# Patient Record
(demographics unavailable — no encounter records)

---

## 2024-12-06 NOTE — HISTORY OF PRESENT ILLNESS
[FreeTextEntry1] : Patient seen for an  elevated and rising PSA He has been on finasteride and alfuzosin for years for LUTs. He stopped in in the Fall and then restarted 1-2 months ago as did note some worsening and then instant improvement when stopped and then restarted. PSA now 4.1 for @1 - has been rechecked at same number. Never been this high before and no prior biopsy. prostate by TRUS 84cc.  He has some LUTs - frequency and nocturia 1-2 times. FOS decent unless holds for prolonged period of time then has hesitancy and slower stream. if drinks more fluids in evening has more nocturia. Also notes more issues frequency and hesitancy etc on airplane.   last visit switched him to Tamsulosin - he sees not change. has frequency and nocturia depending on intake and coffee etc. FOS Ok - rarely good. If holds along time has hesitancy etc.    Opted for telehealth due to COVID19 - last visit had him switch to Terazosin and titrated up to 7mg. Noted better flow, less daytime frequency and fewer nocturia - 2-3 as opposed top > 3. No dizziness or fatigue..   after last visit increased to 5mg BID - has gone backwards - more frequency and nocturia up to 4 times . FOs variable No urge or other incontinence.  on finasteride  voided 80 cc with 6cc peak with PVR 0.  2/23 haven;t seen for 2 years: still on finasteride  had BP issues so switched from terazosin to tamsulosin -  had nocturia 4/5 times a night with hesitancy intermittency. daytime frequency with same issue of hesitancy - no different with medication changes. Pushing doesn't help.  had retention issues also post spinal surgery  PVR 0  5/23 - tried doubling up tamsulosin plus finasteride feels modest improvement. no dizziness  Issue id the nocturia and obstructive issues if holds for a long periods   11/23 on double dose tamsulosin and finasteride - overall feels better. if misses a dose harder to void. Overall FOS better unless holds too long. Nocturia 3 times with no urgency or incontinence.   12/24 on combination therapy voiding fine - nothing has changed  interested in switching out tamsulosin to tadalafil  PSA 2.2

## 2025-02-13 NOTE — ASSESSMENT
[FreeTextEntry1] : I reviewed the risks and benefits of the colonoscopy and answered the patient's questions. Diet and bowel prep instructions were provided. Medication guidance also provided for the days before and day of procedure.  Patient is aware that should they receive sedation for the procedure, they will need to arrange an escort to take them home. Will schedule procedure at [facility]. Miralax prep as this is what he used last time and prep was excellent.  Because he chronically takes ASA and meloxicam (two NSAIDs) and is 70 years old, he may be at increased risk for a GI bleeding event related to NSAID use. He may wish to take omeprazole 20 mg once a day on an empty stomach to decrease this risk.   I spent 8 minutes reviewing the patient's records and 15 minutes in face-to face time with at least 50% of time providing education or coordination of care. I spent an additional 5 minutes completing medical documentation today.

## 2025-02-13 NOTE — ASSESSMENT
[FreeTextEntry1] : I reviewed the risks and benefits of the colonoscopy and answered the patient's questions. Diet and bowel prep instructions were provided. Medication guidance also provided for the days before and day of procedure.  Patient is aware that should they receive sedation for the procedure, they will need to arrange an escort to take them home. Will schedule procedure at [facility]. Miralax prep as this is what he used last time and prep was excellent.  Because he chronically takes ASA and meloxicam (two NSAIDs) and is 70 years old, he may be at increased risk for a GI bleeding event related to NSAID use. He may wish to take omeprazole 20 mg once a day on an empty stomach to decrease this risk.   I spent 8 minutes reviewing the patient's records and 15 minutes in face-to face time with at least 50% of time providing education or coordination of care. I spent an additional 5 minutes completing medical documentation today.

## 2025-02-13 NOTE — PHYSICAL EXAM
[Alert] : alert [Normal Voice/Communication] : normal voice/communication [Healthy Appearing] : healthy appearing [No Acute Distress] : no acute distress [Sclera] : the sclera and conjunctiva were normal [Normal Appearance] : the appearance of the neck was normal [No Neck Mass] : no neck mass was observed [Respiration, Rhythm And Depth] : normal respiratory rhythm and effort [Auscultation Breath Sounds / Voice Sounds] : lungs were clear to auscultation bilaterally [Heart Rate And Rhythm] : heart rate was normal and rhythm regular [Normal S1, S2] : normal S1 and S2 [Murmurs] : no murmurs [Bowel Sounds] : normal bowel sounds [Abdomen Tenderness] : non-tender [No Masses] : no abdominal mass palpated [Abdomen Soft] : soft [] : no hepatosplenomegaly [No CVA Tenderness] : no CVA  tenderness [Normal Color / Pigmentation] : normal skin color and pigmentation [Oriented To Time, Place, And Person] : oriented to person, place, and time

## 2025-03-03 NOTE — END OF VISIT
[FreeTextEntry3] : I, Mario Brown, acted solely as scribe for Dr. El Mckinley DO on this date 03/03/2025.   All medical record entries made by the Scribe were at my, Dr. El Mckinley DO direction and personally dictated by me on 03/03/2025, I have reviewed the chart and agree that the record accurately reflects my personal performance of the history, physical exam, assessment and plan. I have also personally directed, reviewed and agreed with the chart.     [Time Spent: ___ minutes] : I have spent [unfilled] minutes of time on the encounter which excludes teaching and separately reported services.

## 2025-03-03 NOTE — HISTORY OF PRESENT ILLNESS
[FreeTextEntry1] : fasting blood work general follow up [de-identified] : The patient is a 70-year-old male past medical history as below who presents for fasting blood work and general follow-up.  The patient is taking all medications as prescribed.  He continues to take tamsulosin and finasteride for BPH.  He continues to follow-up with his urologist, Dr. Bernardo Delgadillo who added Tadalafil 5 mg qd at his last visit. Patient states that he has nocturia and wakes up approximately 3-4 times each night.  The patient continues to take atorvastatin 20 mg daily.  He denies diffuse myalgias or arthralgias.  The patient travels for work and has difficulty with changing time zones.  He takes Ambien CR 12.5 mg nightly as needed for use only when traveling.  Patient notes h/o spine pain which radiates down to his lower extremities (mainly right side).. Patient says he does walk every day for exercise regimen and maintains a healthy diet but is concerned about recent weight gain of 10 pounds. He was found to have evidence of osteopenia on recent MRI lumbar spine and DEXA scan. DEXA demonstrated bilateral femoral neck osteopenia. Patient will be seeing Dr. Itz Figueroa at [facility]"" who is an osteoporosis specialist.  He is schedule to see him at the end of March 2025. Patient asks for specific blood testing that is required by Dr. Figueroa which includes Alkaline Phosphatase Isoenzyme, Collagen type I C-telopeptide, Parathyroid Hormone intact serum.

## 2025-03-03 NOTE — ASSESSMENT
[FreeTextEntry1] : The patient is a 70-year-old male past medical history as above who presents for fasting blood work and general follow-up.

## 2025-03-03 NOTE — PLAN
[FreeTextEntry1] : Cardiology AVNRT- s/p ablation-continue to follow up with Dr. Carl Mora hyperlipidemia - continue Atorvastatin Calcium 10mg p.o.q.d.; recommended low cholesterol/low fat diet and increase CV exercise - check FLP and LFTs  HOCM-continue to follow up with cardiologist, Dr. Felder Urology BPH - continue Finasteride 5mg p.o.q.d.; continue Tamsulosin HCl 0.4mg p.o. BID, Tadalafil 5 mg qd continue to follow up with urologist, Dr. Bernardo Delgadillo  Musculoskeletal spinal stenosis - continue Lyrica 75 p.o. BID; continue Meloxicam 15mg p.o.p.r.n.  osteopenia- follow up with Dr. Itz Figueroa at \[facility]\"" for further evaluation at the end of March 2025. Psychiatry  anxiety/depression - d/c Bupropion HCl ER 300mg p.o.q.d. and Alprazolam 0.5mg p.o. TID - continue to follow up with NP of Psychiatry Lisa aHrdy intermittent insomnia - c/w  ambien CR 12.5 mg qhs prn Endocrinology hyperglycemia-check A1C, c/w low carbohydrate diet  Blood work drawn at the office today check pending labs- HBA1c, Alkaline Phosphatase isoenzyme, CBC, Collagen Type I C-telopeptide, CMP, Lipid profile, Parathyroid Hormone intact serum, TSH w/ Free T4 reflex, Vitamin D

## 2025-03-03 NOTE — HISTORY OF PRESENT ILLNESS
[FreeTextEntry1] : fasting blood work general follow up [de-identified] : The patient is a 70-year-old male past medical history as below who presents for fasting blood work and general follow-up.  The patient is taking all medications as prescribed.  He continues to take tamsulosin and finasteride for BPH.  He continues to follow-up with his urologist, Dr. Bernardo Delgadillo who added Tadalafil 5 mg qd at his last visit. Patient states that he has nocturia and wakes up approximately 3-4 times each night.  The patient continues to take atorvastatin 20 mg daily.  He denies diffuse myalgias or arthralgias.  The patient travels for work and has difficulty with changing time zones.  He takes Ambien CR 12.5 mg nightly as needed for use only when traveling.  Patient notes h/o spine pain which radiates down to his lower extremities (mainly right side).. Patient says he does walk every day for exercise regimen and maintains a healthy diet but is concerned about recent weight gain of 10 pounds. He was found to have evidence of osteopenia on recent MRI lumbar spine and DEXA scan. DEXA demonstrated bilateral femoral neck osteopenia. Patient [facility]z [facility]a [facility] who is an osteoporosis specialist.  He is schedule to see him at the end of March 2025. Patient asks for specific blood testing that is required by Dr. Figueroa which includes Alkaline Phosphatase Isoenzyme, Collagen type I C-telopeptide, Parathyroid Hormone intact serum.

## 2025-03-03 NOTE — REVIEW OF SYSTEMS
[Recent Change In Weight] : ~T recent weight change [Insomnia] : insomnia [Negative] : Heme/Lymph [Nocturia] : nocturia [Frequency] : frequency [Back Pain] : back pain [FreeTextEntry2] : weight gain of 10 pounds

## 2025-03-03 NOTE — PLAN
[FreeTextEntry1] : Cardiology AVNRT- s/p ablation-continue to follow up with Dr. Carl Mora hyperlipidemia - continue Atorvastatin Calcium 10mg p.o.q.d.; recommended low cholesterol/low fat diet and increase CV exercise - check FLP and LFTs  HOCM-continue to follow up with cardiologist, Dr. Felder Urology BPH - continue Finasteride 5mg p.o.q.d.; continue Tamsulosin HCl 0.4mg p.o. BID, Tadalafil 5 mg qd continue to follow up with urologist, Dr. Bernardo Delgadillo  Musculoskeletal spinal stenosis - continue Lyrica 75 p.o. BID; continue Meloxicam 15mg p.o.p.r.n.  osteopenia- follow up with Dr. Itz Figueroa at [facility] for further evaluation at the end of March 2025. Psychiatry  anxiety/depression - d/c Bupropion HCl ER 300mg p.o.q.d. and Alprazolam 0.5mg p.o. TID - continue to follow up with NP of Psychiatry Lisa Hardy intermittent insomnia - c/w  ambien CR 12.5 mg qhs prn Endocrinology hyperglycemia-check A1C, c/w low carbohydrate diet  Blood work drawn at the office today check pending labs- HBA1c, Alkaline Phosphatase isoenzyme, CBC, Collagen Type I C-telopeptide, CMP, Lipid profile, Parathyroid Hormone intact serum, TSH w/ Free T4 reflex, Vitamin D

## 2025-03-03 NOTE — PHYSICAL EXAM
[No Acute Distress] : no acute distress [Well Nourished] : well nourished [Well Developed] : well developed [Well-Appearing] : well-appearing [Normal Voice/Communication] : normal voice/communication [Normal Sclera/Conjunctiva] : normal sclera/conjunctiva [PERRL] : pupils equal round and reactive to light [EOMI] : extraocular movements intact [Normal Outer Ear/Nose] : the outer ears and nose were normal in appearance [Normal Oropharynx] : the oropharynx was normal [Normal TMs] : both tympanic membranes were normal No [Normal Nasal Mucosa] : the nasal mucosa was normal [No JVD] : no jugular venous distention [No Lymphadenopathy] : no lymphadenopathy [Supple] : supple [Thyroid Normal, No Nodules] : the thyroid was normal and there were no nodules present [No Respiratory Distress] : no respiratory distress  [No Accessory Muscle Use] : no accessory muscle use [Clear to Auscultation] : lungs were clear to auscultation bilaterally [Normal Rate] : normal rate  [Regular Rhythm] : with a regular rhythm [Normal S1, S2] : normal S1 and S2 [No Carotid Bruits] : no carotid bruits [No Abdominal Bruit] : a ~M bruit was not heard ~T in the abdomen [No Varicosities] : no varicosities [Pedal Pulses Present] : the pedal pulses are present [No Edema] : there was no peripheral edema [No Palpable Aorta] : no palpable aorta [Soft] : abdomen soft [Non Tender] : non-tender [Non-distended] : non-distended [No Masses] : no abdominal mass palpated [No HSM] : no HSM [Normal Bowel Sounds] : normal bowel sounds [Normal Supraclavicular Nodes] : no supraclavicular lymphadenopathy [Normal Posterior Cervical Nodes] : no posterior cervical lymphadenopathy [Normal Anterior Cervical Nodes] : no anterior cervical lymphadenopathy [No CVA Tenderness] : no CVA  tenderness [No Spinal Tenderness] : no spinal tenderness [No Joint Swelling] : no joint swelling [Grossly Normal Strength/Tone] : grossly normal strength/tone [No Rash] : no rash [Coordination Grossly Intact] : coordination grossly intact [No Focal Deficits] : no focal deficits [Normal Gait] : normal gait [Deep Tendon Reflexes (DTR)] : deep tendon reflexes were 2+ and symmetric [Speech Grossly Normal] : speech grossly normal [Memory Grossly Normal] : memory grossly normal [Normal Affect] : the affect was normal [Alert and Oriented x3] : oriented to person, place, and time [Normal Mood] : the mood was normal [Normal Insight/Judgement] : insight and judgment were intact [Normal] : affect was normal and insight and judgment were intact [Kyphosis] : no kyphosis [Scoliosis] : no scoliosis [Acne] : no acne

## 2025-06-13 NOTE — CURRENT MEDS
[Takes medication as prescribed] : takes [Yes] : Reviewed medication list for presence of high-risk medications. [Opioids] : opioids [FreeTextEntry1] : on sleep aid

## 2025-06-13 NOTE — PHYSICAL EXAM
[No Acute Distress] : no acute distress [Well Nourished] : well nourished [Well Developed] : well developed [Well-Appearing] : well-appearing [Normal Voice/Communication] : normal voice/communication [Normal Sclera/Conjunctiva] : normal sclera/conjunctiva [PERRL] : pupils equal round and reactive to light [EOMI] : extraocular movements intact [Normal Outer Ear/Nose] : the outer ears and nose were normal in appearance [Normal Oropharynx] : the oropharynx was normal [Normal TMs] : both tympanic membranes were normal [Normal Nasal Mucosa] : the nasal mucosa was normal [No JVD] : no jugular venous distention [No Lymphadenopathy] : no lymphadenopathy [Supple] : supple [Thyroid Normal, No Nodules] : the thyroid was normal and there were no nodules present [No Respiratory Distress] : no respiratory distress  [No Accessory Muscle Use] : no accessory muscle use [Clear to Auscultation] : lungs were clear to auscultation bilaterally [Normal Rate] : normal rate  [Regular Rhythm] : with a regular rhythm [Normal S1, S2] : normal S1 and S2 [No Carotid Bruits] : no carotid bruits [No Abdominal Bruit] : a ~M bruit was not heard ~T in the abdomen [No Varicosities] : no varicosities [Pedal Pulses Present] : the pedal pulses are present [No Edema] : there was no peripheral edema [No Palpable Aorta] : no palpable aorta [Non Tender] : non-tender [Soft] : abdomen soft [Non-distended] : non-distended [No Masses] : no abdominal mass palpated [No HSM] : no HSM [Normal Bowel Sounds] : normal bowel sounds [Normal Supraclavicular Nodes] : no supraclavicular lymphadenopathy [Normal Posterior Cervical Nodes] : no posterior cervical lymphadenopathy [Normal Anterior Cervical Nodes] : no anterior cervical lymphadenopathy [No CVA Tenderness] : no CVA  tenderness [No Spinal Tenderness] : no spinal tenderness [No Joint Swelling] : no joint swelling [Grossly Normal Strength/Tone] : grossly normal strength/tone [No Rash] : no rash [Coordination Grossly Intact] : coordination grossly intact [No Focal Deficits] : no focal deficits [Normal Gait] : normal gait [Deep Tendon Reflexes (DTR)] : deep tendon reflexes were 2+ and symmetric [Speech Grossly Normal] : speech grossly normal [Memory Grossly Normal] : memory grossly normal [Normal Affect] : the affect was normal [Alert and Oriented x3] : oriented to person, place, and time [Normal Mood] : the mood was normal [Normal Insight/Judgement] : insight and judgment were intact [Normal] : affect was normal and insight and judgment were intact [Kyphosis] : no kyphosis [Scoliosis] : no scoliosis [Acne] : no acne [de-identified] : surgical scar on back - hematoma on abdomen site ioff injection

## 2025-06-13 NOTE — HISTORY OF PRESENT ILLNESS
[FreeTextEntry1] : Renewal of medication for insomnia, discussion on BPH medication  [de-identified] : The patient is a 70-year-old male past medical history as below who presents for fasting blood work and general follow-up.  The patient is taking all medications as prescribed.  He continues to take tadalafil and finasteride for BPH.  He continues to follow-up with his urologist, Dr. Bernardo Delgadillo. Patient continues to have nocturia and wakes up approximately 3-4 times each night. States when he tries to urinate at night the urine is restricted to come out.  States "it is locked up"  The patient continues to take atorvastatin 20 mg daily for hyperlipidemia.  He denies diffuse myalgias or arthralgias.   The patient travels for work and has difficulty with changing time zones.  He takes Ambien CR 12.5 mg nightly as needed for use only when traveling.  Also, states at time takes sonata if all requiring a few hours of sleep. States he does not take both at the same time. Denies any daytime fatigue when he takes a sleep aid. Denies hx of falling  Reviewed immunization states he up to date w/ covid boosters- Also states he got Tdap about 4 years ago.  Had the Flu shot in the Fall

## 2025-06-13 NOTE — COUNSELING
[Encouraged to increase physical activity] : Encouraged to increase physical activity [Decrease Portions] : decrease portions [FreeTextEntry2] : low glycemic and low fat

## 2025-06-13 NOTE — HEALTH RISK ASSESSMENT
[No falls in past year] : Patient reported no falls in the past year [Never] : Never [Independent] : managing finances

## 2025-06-13 NOTE — END OF VISIT
[Time Spent: ___ minutes] : I have spent [unfilled] minutes of time on the encounter which excludes teaching and separately reported services. [FreeTextEntry3] : I, Mario Brown, acted solely as scribe for Dr. El Mckinley DO on this date 03/03/2025.   All medical record entries made by the Scribe were at my, Dr. El Mckinley DO direction and personally dictated by me on 03/03/2025, I have reviewed the chart and agree that the record accurately reflects my personal performance of the history, physical exam, assessment and plan. I have also personally directed, reviewed and agreed with the chart.

## 2025-06-13 NOTE — ASSESSMENT
[FreeTextEntry1] : Mr. JO is a 70 year-old male, with a past medical history as noted above, who present to the office today for medication renewal for insomnia - discussion on BPH

## 2025-06-13 NOTE — REVIEW OF SYSTEMS
[Nocturia] : nocturia [Back Pain] : back pain [Insomnia] : insomnia [Hesitancy] : hesitancy [Frequency] : frequency [Negative] : Constitutional [Fever] : no fever [Chills] : no chills [Recent Change In Weight] : ~T no recent weight change [Chest Pain] : no chest pain [Palpitations] : no palpitations [Lower Ext Edema] : no lower extremity edema [Paroxysmal Nocturnal Dyspnea] : no paroxysmal nocturnal dyspnea [Shortness Of Breath] : no shortness of breath [Wheezing] : no wheezing [Dyspnea on Exertion] : not dyspnea on exertion [Abdominal Pain] : no abdominal pain [Nausea] : no nausea [Constipation] : no constipation [Diarrhea] : no diarrhea [Vomiting] : no vomiting [Heartburn] : no heartburn [Melena] : no melena [Dysuria] : no dysuria [Incontinence] : no incontinence [Hematuria] : no hematuria [Itching] : no itching [Mole Changes] : no mole changes [Nail Changes] : no nail changes [Skin Rash] : no skin rash [Headache] : no headache [Dizziness] : no dizziness [Confusion] : no confusion [Unsteady Walk] : no ataxia [Memory Loss] : no memory loss [Suicidal] : not suicidal [Anxiety] : no anxiety [Easy Bruising] : no easy bruising

## 2025-06-13 NOTE — PLAN
[FreeTextEntry1] : Cardiology AVNRT- s/p ablation-continue to follow up with Dr. Carl Mora hyperlipidemia - continue Atorvastatin Calcium 20mg p.o.q.d.; recommended low cholesterol/low fat diet and  goal ldl less than 70 increase CV exercise - last LDL 82 - repeat lipids July 2025.  HOCM-continue to follow up with cardiologist, Dr. Felder.  Urology BPH - continue Finasteride 5mg p.o.q.d.; continue Tadalafil 5 mg qd continue to follow up with urologist, Dr. Bernardo Delgadillo  Advised to discuss embolization of the prostate for a possible treatment  Musculoskeletal spinal stenosis - continue Lyrica 75 p.o. BID; continue Meloxicam 15mg p.o.p.r.n.  osteopenia- follow up with Dr. Itz Figueroa at [facility] for further evaluation at the end of March 2025. Currently on Tymlos  pen injection   Psychiatry  intermittent insomnia - c/w  Ambien CR 12.5 mg qhs prn reviewed  - Discussed abuse potential Advised risk of falls in the elderly Advised if on Ambien does not need to take Sonata  -[place] Advised sleep hygiene   Endocrinology hyperglycemia-check A1C 07/25, c/w low carbohydrate diet. Advised exercise Monitor a1c if remains elevated consider treatment for Pre DM   I spent 30 Minutes with the patient, half of which we discussed finding on physical exam and coordinated care.  As well as reviewed my plans and follow ups. Dragon speech recognition software was used to create portions of this document.  An attempt at proofreading has been made to minimize errors please call if any questions arise.